# Patient Record
Sex: MALE | Race: WHITE | NOT HISPANIC OR LATINO
[De-identification: names, ages, dates, MRNs, and addresses within clinical notes are randomized per-mention and may not be internally consistent; named-entity substitution may affect disease eponyms.]

---
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
## 2023-07-22 ENCOUNTER — APPOINTMENT (OUTPATIENT)
Dept: PEDIATRICS | Facility: CLINIC | Age: 18
End: 2023-07-22
Payer: COMMERCIAL

## 2023-07-22 VITALS
WEIGHT: 186 LBS | SYSTOLIC BLOOD PRESSURE: 136 MMHG | BODY MASS INDEX: 27.87 KG/M2 | HEART RATE: 75 BPM | HEIGHT: 68.5 IN | DIASTOLIC BLOOD PRESSURE: 82 MMHG

## 2023-07-22 DIAGNOSIS — Z00.00 ENCOUNTER FOR GENERAL ADULT MEDICAL EXAMINATION W/OUT ABNORMAL FINDINGS: ICD-10-CM

## 2023-07-22 PROCEDURE — 90620 MENB-4C VACCINE IM: CPT

## 2023-07-22 PROCEDURE — 99173 VISUAL ACUITY SCREEN: CPT | Mod: 59

## 2023-07-22 PROCEDURE — 96127 BRIEF EMOTIONAL/BEHAV ASSMT: CPT

## 2023-07-22 PROCEDURE — 96160 PT-FOCUSED HLTH RISK ASSMT: CPT | Mod: 59

## 2023-07-22 PROCEDURE — 90471 IMMUNIZATION ADMIN: CPT

## 2023-07-22 PROCEDURE — 99395 PREV VISIT EST AGE 18-39: CPT | Mod: 25

## 2023-07-22 PROCEDURE — 92551 PURE TONE HEARING TEST AIR: CPT

## 2023-07-22 RX ORDER — MUPIROCIN 20 MG/G
2 OINTMENT TOPICAL 3 TIMES DAILY
Qty: 2 | Refills: 0 | Status: DISCONTINUED | COMMUNITY
Start: 2021-08-10 | End: 2023-07-22

## 2023-07-22 RX ORDER — HYDROCORTISONE 25 MG/G
2.5 CREAM TOPICAL
Qty: 2 | Refills: 0 | Status: DISCONTINUED | COMMUNITY
Start: 2021-08-10 | End: 2023-07-22

## 2023-07-22 RX ORDER — ALBUTEROL SULFATE 90 UG/1
108 (90 BASE) INHALANT RESPIRATORY (INHALATION)
Qty: 1 | Refills: 0 | Status: DISCONTINUED | COMMUNITY
Start: 2021-08-03 | End: 2023-07-22

## 2023-07-22 RX ORDER — METHYLPREDNISOLONE 4 MG/1
4 TABLET ORAL
Qty: 1 | Refills: 0 | Status: DISCONTINUED | COMMUNITY
Start: 2021-03-17 | End: 2023-07-22

## 2023-07-22 RX ORDER — INHALER,ASSIST DEVICE,LG MASK
SPACER (EA) MISCELLANEOUS
Qty: 1 | Refills: 0 | Status: DISCONTINUED | COMMUNITY
Start: 2021-08-03 | End: 2023-07-22

## 2023-07-22 NOTE — DISCUSSION/SUMMARY
[] : The components of the vaccine(s) to be administered today are listed in the plan of care. The disease(s) for which the vaccine(s) are intended to prevent and the risks have been discussed with the caretaker.  The risks are also included in the appropriate vaccination information statements which have been provided to the patient's caregiver.  The caregiver has given consent to vaccinate. [Met privately with the adolescent for part of the office visit?] : Met privately with the adolescent for part of the office visit? Yes [Adolescent demonstrates understanding of his/her conditions and how to take prescribed medications?] : Adolescent demonstrates understanding of his/her conditions and how to take prescribed medications? Yes [Adolescent asks questions during each office  visit and participates in the care plan?] : Adolescent asks questions during each office visit and participates in the care plan? Yes [FreeTextEntry1] : \par discussed Hep A vaccine

## 2023-07-22 NOTE — PHYSICAL EXAM
[No Acute Distress] : no acute distress [Alert] : alert [Normocephalic] : normocephalic [EOMI Bilateral] : EOMI bilateral [Clear tympanic membranes with bony landmarks and light reflex present bilaterally] : clear tympanic membranes with bony landmarks and light reflex present bilaterally  [Pink Nasal Mucosa] : pink nasal mucosa [Nonerythematous Oropharynx] : nonerythematous oropharynx [Supple, full passive range of motion] : supple, full passive range of motion [No Palpable Masses] : no palpable masses [Clear to Auscultation Bilaterally] : clear to auscultation bilaterally [Regular Rate and Rhythm] : regular rate and rhythm [Normal S1, S2 audible] : normal S1, S2 audible [No Murmurs] : no murmurs [Soft] : soft [+2 Femoral Pulses] : +2 femoral pulses [NonTender] : non tender [Non Distended] : non distended [Normoactive Bowel Sounds] : normoactive bowel sounds [No Hepatomegaly] : no hepatomegaly [No Splenomegaly] : no splenomegaly [Tavares: _____] : Tavares [unfilled] [No Abnormal Lymph Nodes Palpated] : no abnormal lymph nodes palpated [Normal Muscle Tone] : normal muscle tone [No pain or deformities with palpation of bone, muscles, joints] : no pain or deformities with palpation of bone, muscles, joints [No Gait Asymmetry] : no gait asymmetry [Straight] : straight [No Scoliosis] : no scoliosis [+2 Patella DTR] : +2 patella DTR [Cranial Nerves Grossly Intact] : cranial nerves grossly intact [No Rash or Lesions] : no rash or lesions

## 2023-07-22 NOTE — HISTORY OF PRESENT ILLNESS
[Yes] : Patient goes to dentist yearly [Grade: ____] : Grade: [unfilled] [No] : No cigarette smoke exposure [Uses safety belts/safety equipment] : uses safety belts/safety equipment  [Sleep Concerns] : no sleep concerns [Impaired/distracted driving] : no impaired/distracted driving [Gets depressed, anxious, or irritable/has mood swings] : does not get depressed, anxious, or irritable/has mood swings [With Teen] : teen [FreeTextEntry7] : 18 year well visit. doing well [de-identified] : no concerns today [de-identified] : variety of foods [de-identified] : football [FreeTextEntry1] : \par Cardiac Checklist reviewed and discussed as needed\par Coordination of Care reviewed - questions/concerns discussed as needed\par

## 2023-09-18 ENCOUNTER — APPOINTMENT (OUTPATIENT)
Dept: ORTHOPEDIC SURGERY | Facility: CLINIC | Age: 18
End: 2023-09-18
Payer: COMMERCIAL

## 2023-09-18 PROCEDURE — 73030 X-RAY EXAM OF SHOULDER: CPT | Mod: RT

## 2023-09-18 PROCEDURE — 99204 OFFICE O/P NEW MOD 45 MIN: CPT | Mod: 25

## 2023-09-18 RX ORDER — METHYLPREDNISOLONE 4 MG/1
4 TABLET ORAL
Qty: 1 | Refills: 0 | Status: ACTIVE | COMMUNITY
Start: 2023-09-18 | End: 1900-01-01

## 2023-09-20 ENCOUNTER — NON-APPOINTMENT (OUTPATIENT)
Age: 18
End: 2023-09-20

## 2023-10-12 RX ORDER — MELOXICAM 15 MG/1
15 TABLET ORAL
Qty: 90 | Refills: 0 | Status: ACTIVE | COMMUNITY
Start: 2023-09-18 | End: 1900-01-01

## 2023-10-22 ENCOUNTER — NON-APPOINTMENT (OUTPATIENT)
Age: 18
End: 2023-10-22

## 2023-10-23 ENCOUNTER — NON-APPOINTMENT (OUTPATIENT)
Age: 18
End: 2023-10-23

## 2023-10-23 ENCOUNTER — RESULT REVIEW (OUTPATIENT)
Age: 18
End: 2023-10-23

## 2023-10-23 DIAGNOSIS — S43.431A SUPERIOR GLENOID LABRUM LESION OF RIGHT SHOULDER, INITIAL ENCOUNTER: ICD-10-CM

## 2023-10-23 DIAGNOSIS — S43.491S OTHER SPRAIN OF RIGHT SHOULDER JOINT, SEQUELA: ICD-10-CM

## 2023-11-13 ENCOUNTER — OUTPATIENT (OUTPATIENT)
Dept: OUTPATIENT SERVICES | Facility: HOSPITAL | Age: 18
LOS: 1 days | End: 2023-11-13
Payer: COMMERCIAL

## 2023-11-13 VITALS
DIASTOLIC BLOOD PRESSURE: 79 MMHG | WEIGHT: 177.91 LBS | TEMPERATURE: 98 F | RESPIRATION RATE: 15 BRPM | SYSTOLIC BLOOD PRESSURE: 130 MMHG | HEART RATE: 68 BPM | OXYGEN SATURATION: 99 % | HEIGHT: 69 IN

## 2023-11-13 DIAGNOSIS — Z01.818 ENCOUNTER FOR OTHER PREPROCEDURAL EXAMINATION: ICD-10-CM

## 2023-11-13 DIAGNOSIS — S43.491A OTHER SPRAIN OF RIGHT SHOULDER JOINT, INITIAL ENCOUNTER: ICD-10-CM

## 2023-11-13 DIAGNOSIS — S43.431A SUPERIOR GLENOID LABRUM LESION OF RIGHT SHOULDER, INITIAL ENCOUNTER: ICD-10-CM

## 2023-11-13 DIAGNOSIS — S43.439A SUPERIOR GLENOID LABRUM LESION OF UNSPECIFIED SHOULDER, INITIAL ENCOUNTER: ICD-10-CM

## 2023-11-13 PROCEDURE — G0463: CPT

## 2023-11-13 NOTE — H&P PST ADULT - HEMATOLOGY/LYMPHATICS
Interventional Cardiology appointment made for patient.  Detailed VM message left & My [facility] message sent to pt informing him of details & contact information.   details…

## 2023-11-13 NOTE — H&P PST ADULT - MUSCULOSKELETAL
no joint erythema/no joint warmth/no calf tenderness/decreased ROM due to pain/no chest wall tenderness/extremities exam details…

## 2023-11-13 NOTE — H&P PST ADULT - PROBLEM SELECTOR PLAN 1
Right shoulder arthroscopy anterior labral repair is planned for 11/29/2023  Pre op instructions were reviewed  best wishes offered

## 2023-11-13 NOTE — H&P PST ADULT - HISTORY OF PRESENT ILLNESS
17 yo male reports football injury early 2022 with ROM activity. He reports stiffness and soreness after he moves his right shoulder.    He is scheduled for right shoulder arthroscopy anterior labral repair on 11/29/2023 @ [facility].   17 yo male reports football injury early 2022 with ROM activity. He reports stiffness and soreness after he moves his right shoulder.    He is scheduled for right shoulder arthroscopy anterior labral repair on 11/29/2023 @ [facility].

## 2023-11-27 ENCOUNTER — APPOINTMENT (OUTPATIENT)
Dept: PEDIATRICS | Facility: CLINIC | Age: 18
End: 2023-11-27
Payer: COMMERCIAL

## 2023-11-27 VITALS — TEMPERATURE: 98.4 F | WEIGHT: 176 LBS

## 2023-11-27 DIAGNOSIS — N50.819 TESTICULAR PAIN, UNSPECIFIED: ICD-10-CM

## 2023-11-27 DIAGNOSIS — R10.32 RIGHT LOWER QUADRANT PAIN: ICD-10-CM

## 2023-11-27 DIAGNOSIS — R10.31 RIGHT LOWER QUADRANT PAIN: ICD-10-CM

## 2023-11-27 PROBLEM — S43.439A SUPERIOR GLENOID LABRUM LESION OF UNSPECIFIED SHOULDER, INITIAL ENCOUNTER: Chronic | Status: ACTIVE | Noted: 2023-11-13

## 2023-11-27 PROCEDURE — 99214 OFFICE O/P EST MOD 30 MIN: CPT

## 2023-11-28 ENCOUNTER — TRANSCRIPTION ENCOUNTER (OUTPATIENT)
Age: 18
End: 2023-11-28

## 2023-11-28 RX ORDER — OXYCODONE 5 MG/1
5 TABLET ORAL
Qty: 20 | Refills: 0 | Status: ACTIVE | COMMUNITY
Start: 2023-11-28 | End: 1900-01-01

## 2023-11-28 RX ORDER — ASPIRIN 81 MG/1
81 TABLET, COATED ORAL
Qty: 60 | Refills: 0 | Status: ACTIVE | COMMUNITY
Start: 2023-11-28 | End: 1900-01-01

## 2023-11-28 RX ORDER — POLYETHYLENE GLYCOL 3350 17 G/17G
17 POWDER, FOR SOLUTION ORAL DAILY
Qty: 14 | Refills: 0 | Status: ACTIVE | COMMUNITY
Start: 2023-11-28 | End: 1900-01-01

## 2023-11-28 RX ORDER — MELOXICAM 15 MG/1
15 TABLET ORAL DAILY
Qty: 30 | Refills: 2 | Status: ACTIVE | COMMUNITY
Start: 2023-11-28 | End: 1900-01-01

## 2023-11-28 RX ORDER — FAMOTIDINE 20 MG/1
20 TABLET, FILM COATED ORAL
Qty: 14 | Refills: 0 | Status: ACTIVE | COMMUNITY
Start: 2023-11-28 | End: 1900-01-01

## 2023-11-29 ENCOUNTER — TRANSCRIPTION ENCOUNTER (OUTPATIENT)
Age: 18
End: 2023-11-29

## 2023-11-29 ENCOUNTER — APPOINTMENT (OUTPATIENT)
Dept: ORTHOPEDIC SURGERY | Facility: HOSPITAL | Age: 18
End: 2023-11-29
Payer: COMMERCIAL

## 2023-11-29 ENCOUNTER — OUTPATIENT (OUTPATIENT)
Dept: OUTPATIENT SERVICES | Facility: HOSPITAL | Age: 18
LOS: 1 days | End: 2023-11-29
Payer: COMMERCIAL

## 2023-11-29 VITALS
RESPIRATION RATE: 19 BRPM | DIASTOLIC BLOOD PRESSURE: 62 MMHG | OXYGEN SATURATION: 98 % | SYSTOLIC BLOOD PRESSURE: 112 MMHG | HEART RATE: 70 BPM

## 2023-11-29 VITALS
TEMPERATURE: 98 F | WEIGHT: 173.94 LBS | DIASTOLIC BLOOD PRESSURE: 97 MMHG | OXYGEN SATURATION: 100 % | RESPIRATION RATE: 15 BRPM | HEIGHT: 69 IN | HEART RATE: 73 BPM | SYSTOLIC BLOOD PRESSURE: 129 MMHG

## 2023-11-29 DIAGNOSIS — S43.491A OTHER SPRAIN OF RIGHT SHOULDER JOINT, INITIAL ENCOUNTER: ICD-10-CM

## 2023-11-29 DIAGNOSIS — S43.431A SUPERIOR GLENOID LABRUM LESION OF RIGHT SHOULDER, INITIAL ENCOUNTER: ICD-10-CM

## 2023-11-29 PROCEDURE — C1713: CPT

## 2023-11-29 PROCEDURE — 29806 SHO ARTHRS SRG CAPSULORRAPHY: CPT | Mod: RT

## 2023-11-29 DEVICE — IMPLANTABLE DEVICE: Type: IMPLANTABLE DEVICE | Status: FUNCTIONAL

## 2023-11-29 DEVICE — ANCHOR SUT BIO-COMP PUSHLOCK 2.9X12.5MM MUST ORD IN 5EA: Type: IMPLANTABLE DEVICE | Status: FUNCTIONAL

## 2023-11-29 RX ORDER — HYDROMORPHONE HYDROCHLORIDE 2 MG/ML
1 INJECTION INTRAMUSCULAR; INTRAVENOUS; SUBCUTANEOUS
Refills: 0 | Status: DISCONTINUED | OUTPATIENT
Start: 2023-11-29 | End: 2023-11-30

## 2023-11-29 RX ORDER — HYDROMORPHONE HYDROCHLORIDE 2 MG/ML
0.5 INJECTION INTRAMUSCULAR; INTRAVENOUS; SUBCUTANEOUS
Refills: 0 | Status: DISCONTINUED | OUTPATIENT
Start: 2023-11-29 | End: 2023-11-30

## 2023-11-29 RX ORDER — APREPITANT 80 MG/1
40 CAPSULE ORAL ONCE
Refills: 0 | Status: COMPLETED | OUTPATIENT
Start: 2023-11-29 | End: 2023-11-29

## 2023-11-29 RX ORDER — SODIUM CHLORIDE 9 MG/ML
1000 INJECTION, SOLUTION INTRAVENOUS
Refills: 0 | Status: DISCONTINUED | OUTPATIENT
Start: 2023-11-29 | End: 2023-11-30

## 2023-11-29 RX ORDER — ONDANSETRON 8 MG/1
4 TABLET, FILM COATED ORAL ONCE
Refills: 0 | Status: DISCONTINUED | OUTPATIENT
Start: 2023-11-29 | End: 2023-11-30

## 2023-11-29 RX ORDER — CHLORHEXIDINE GLUCONATE 213 G/1000ML
1 SOLUTION TOPICAL ONCE
Refills: 0 | Status: COMPLETED | OUTPATIENT
Start: 2023-11-29 | End: 2023-11-29

## 2023-11-29 RX ORDER — CEFAZOLIN SODIUM 1 G
2000 VIAL (EA) INJECTION ONCE
Refills: 0 | Status: COMPLETED | OUTPATIENT
Start: 2023-11-29 | End: 2023-11-29

## 2023-11-29 RX ORDER — OXYCODONE HYDROCHLORIDE 5 MG/1
5 TABLET ORAL ONCE
Refills: 0 | Status: DISCONTINUED | OUTPATIENT
Start: 2023-11-29 | End: 2023-11-30

## 2023-11-29 RX ORDER — MELOXICAM 15 MG/1
1 TABLET ORAL
Refills: 0 | DISCHARGE

## 2023-11-29 RX ADMIN — SODIUM CHLORIDE 75 MILLILITER(S): 9 INJECTION, SOLUTION INTRAVENOUS at 17:29

## 2023-11-29 RX ADMIN — CHLORHEXIDINE GLUCONATE 1 APPLICATION(S): 213 SOLUTION TOPICAL at 12:32

## 2023-11-29 RX ADMIN — APREPITANT 40 MILLIGRAM(S): 80 CAPSULE ORAL at 12:32

## 2023-11-29 NOTE — ASU DISCHARGE PLAN (ADULT/PEDIATRIC) - CARE PROVIDER_API CALL
Lukas Cid  Orthopaedic Surgery  [address], [address]  [place] [address]  Phone: [phone]  Fax: [phone]  Follow Up Time: 2 weeks

## 2023-11-29 NOTE — BRIEF OPERATIVE NOTE - NSICDXBRIEFPREOP_GEN_ALL_CORE_FT
PRE-OP DIAGNOSIS:  Shoulder subluxation, right, initial encounter 29-Nov-2023 13:39:56  Brissa Whitlock

## 2023-11-29 NOTE — ASU PATIENT PROFILE, ADULT - FALL HARM RISK - UNIVERSAL INTERVENTIONS
Bed in lowest position, wheels locked, appropriate side rails in place/Call bell, personal items and telephone in reach/Instruct patient to call for assistance before getting out of bed or chair/Non-slip footwear when patient is out of bed/[place] to call system/Physically safe environment - no spills, clutter or unnecessary equipment/Purposeful Proactive Rounding/Room/bathroom lighting operational, light cord in reach

## 2023-11-29 NOTE — BRIEF OPERATIVE NOTE - NSICDXBRIEFPOSTOP_GEN_ALL_CORE_FT
POST-OP DIAGNOSIS:  Shoulder subluxation, right, initial encounter 29-Nov-2023 13:39:50  Brissa Whitlock

## 2023-12-21 ENCOUNTER — APPOINTMENT (OUTPATIENT)
Dept: ORTHOPEDIC SURGERY | Facility: CLINIC | Age: 18
End: 2023-12-21
Payer: COMMERCIAL

## 2023-12-21 VITALS — WEIGHT: 176 LBS | HEIGHT: 68.5 IN | BODY MASS INDEX: 26.37 KG/M2

## 2023-12-21 PROCEDURE — 99024 POSTOP FOLLOW-UP VISIT: CPT

## 2023-12-21 NOTE — HISTORY OF PRESENT ILLNESS
[de-identified] :  Date of surgery was 11/29/23 Surgery performed was right shoulder anterior labral repair Current pain score is 1-2 Current medications taken are tylenol Physical therapy is being done at [facility] in [place]  He is using sling

## 2023-12-21 NOTE — IMAGING
[de-identified] :   Laterality is right   Patient is in no acute distress, alert and oriented Sensation is grossly intact to light touch in the hand Axillary sensation is intact in the shoulder Motor function is 5/5 in the hand Deltoid and biceps are firing Capillary refill is less than 2 seconds in the fingers Lymphadenopathy is not present Peripheral edema is not present  Skin is intact Incisions are healing well Swelling is minimal  Active forward elevation is 150 Passive forward elevation is 165 External rotation at the side is 30  Forward elevation strength is 5-/5 External rotation strength at the side is 5/5 Internal rotation strength at the side is 5/5 Deltoid strength of anterior, posterior and lateral heads is 5/5

## 2023-12-21 NOTE — DISCUSSION/SUMMARY
[de-identified] : The patient has made appropriate post-operative progress and agreed to proceed with the plan of care below. All questions were answered.  -Patient will proceed with physical therapy as per the post-operative protocol. -Ibuprofen and acetaminophen as needed for pain. -Aspirin for DVT prophylaxis until 1 month post-operatively -Discontinue sling at 6 weeks post-operatively.  -Follow up in 4 weeks without XR

## 2024-03-15 ENCOUNTER — APPOINTMENT (OUTPATIENT)
Dept: ORTHOPEDIC SURGERY | Facility: CLINIC | Age: 19
End: 2024-03-15
Payer: COMMERCIAL

## 2024-03-15 DIAGNOSIS — Z47.89 ENCOUNTER FOR OTHER ORTHOPEDIC AFTERCARE: ICD-10-CM

## 2024-03-15 PROCEDURE — 99213 OFFICE O/P EST LOW 20 MIN: CPT

## 2024-03-15 NOTE — HISTORY OF PRESENT ILLNESS
[de-identified] : 03/15/2023: f/u for right shoulder. PT 3x a week with relief. Feels better than last visit, no pain. shoulder feels stable   Date of surgery was 11/29/23 Surgery performed was right shoulder anterior labral repair Current pain score is 1-2 Current medications taken are tylenol Physical therapy is being done at [facility] in [place]  He is using sling

## 2024-03-15 NOTE — IMAGING
[de-identified] : Laterality is right    Patient is in no acute distress, alert and oriented Sensation is grossly intact to light touch in the hand Axillary sensation is intact in the shoulder Motor function is 5/5 in the hand Deltoid and biceps are firing Capillary refill is less than 2 seconds in the fingers Lymphadenopathy is not present Peripheral edema is not present   Skin is intact Incisions are healing well Swelling is minimal   Active forward elevation is 175 Passive forward elevation is 175 External rotation at the side is 80 Internal rotation behind the back is to the level of T7   Forward elevation strength is 5/5 External rotation strength at the side is 5/5 Internal rotation strength at the side is 5/5 Deltoid strength of anterior, posterior and lateral heads is 5/5  Apprehension is negative

## 2024-03-15 NOTE — DISCUSSION/SUMMARY
[de-identified] : The patient has made appropriate post-operative progress and agreed to proceed with the plan of care below. All questions were answered.  -Patient will continue with physical therapy as per the post-operative protocol. -Ibuprofen and acetaminophen as needed for pain. -Can return to weight room and non contact activities next week. Return to full activity at 4.5 months postoperatively. -Discussed risk of recurrence given contact sports. Patient can wear nagel brace during spring football as needed. -Follow up as needed.

## 2024-05-29 NOTE — H&P PST ADULT - HEIGHT IN FEET
[facility] [place] notified and made aware that pt can not be monitored via remote tele as there are no tele boxes. 5

## (undated) DEVICE — SLING SHOULDER ABDUCTION LARGE

## (undated) DEVICE — BUR LINVATEC OVAL 4MM

## (undated) DEVICE — Device

## (undated) DEVICE — DRAPE U POLY BLUE 60X72"

## (undated) DEVICE — DRAPE INSTRUMENT POUCH 6.75" X 11"

## (undated) DEVICE — ARTHREX KIT SUTURETAK 3MM

## (undated) DEVICE — SUT FIBERWIRE #2 38" STRAND 1 BLUE T-5 TAPER

## (undated) DEVICE — ARTHREX PERCUTANEOUS INSERTION KIT FOR 2.9MM PUSHLOCK

## (undated) DEVICE — SUT LASSO QUICKPASS 45 DEG CURVED LEFT

## (undated) DEVICE — WARMING BLANKET FULL ADULT

## (undated) DEVICE — CANNULA LINVATEC SHOULDER 7CM (GREY & ORANGE)

## (undated) DEVICE — ARTHREX KIT FOR 3.5MM PUSHLOCK

## (undated) DEVICE — SHAVER BLADE LINVATEC ULTRAFRR 3.5MM

## (undated) DEVICE — TUBING DEPUY MITEK FMS OUTFLOW

## (undated) DEVICE — CANNULA ARTHREX CRYSTAL SMOOTH 5.75MMX7MM ORANGE

## (undated) DEVICE — WARMING BLANKET LOWER ADULT

## (undated) DEVICE — TUBING SUCTION 20FT

## (undated) DEVICE — SUT MONOCRYL 3-0 27" PS-2 UNDYED

## (undated) DEVICE — TUBING DEPUY MITEK FMS VUE INFLOW

## (undated) DEVICE — NDL HYPO SAFE 22G X 1.5" (BLACK)

## (undated) DEVICE — ARTHREX STAR SLEEVE W VELCRO

## (undated) DEVICE — S&N ARTHROCARE WAND TURBOVAC 90 DEGREE

## (undated) DEVICE — ADAPTER DUAL SPIKE

## (undated) DEVICE — SUT VICRYL PLUS 2-0 27" PS-2 UNDYED

## (undated) DEVICE — NDL SPINAL 18G X 3.5" (PINK)

## (undated) DEVICE — CANNULA ARTHREX TWIST IN 6MMX7CM

## (undated) DEVICE — DRAPE TOP SHEET 53" X 101"

## (undated) DEVICE — PACK SHOULDER ARTHROSCOPY

## (undated) DEVICE — S&N ARTHROCARE WAND COBLATION WEREWOLF FLOW 90

## (undated) DEVICE — GLV 8 PROTEXIS (WHITE)

## (undated) DEVICE — CANNULA ARTHREX TWIST IN NO SQUIRT CAP 7X7 PURPLE

## (undated) DEVICE — SUT LASSO 45 DEGREE RIGHT CURVE

## (undated) DEVICE — VENODYNE/SCD SLEEVE CALF MEDIUM